# Patient Record
Sex: FEMALE | Race: WHITE
[De-identification: names, ages, dates, MRNs, and addresses within clinical notes are randomized per-mention and may not be internally consistent; named-entity substitution may affect disease eponyms.]

---

## 2018-03-06 ENCOUNTER — HOSPITAL ENCOUNTER (EMERGENCY)
Dept: HOSPITAL 25 - UCCORT | Age: 43
Discharge: HOME | End: 2018-03-06
Payer: COMMERCIAL

## 2018-03-06 DIAGNOSIS — R05: ICD-10-CM

## 2018-03-06 DIAGNOSIS — F17.210: ICD-10-CM

## 2018-03-06 DIAGNOSIS — Z88.0: ICD-10-CM

## 2018-03-06 DIAGNOSIS — J34.89: ICD-10-CM

## 2018-03-06 DIAGNOSIS — J06.9: Primary | ICD-10-CM

## 2018-03-06 PROCEDURE — 99202 OFFICE O/P NEW SF 15 MIN: CPT

## 2018-03-06 PROCEDURE — 87502 INFLUENZA DNA AMP PROBE: CPT

## 2018-03-06 PROCEDURE — G0463 HOSPITAL OUTPT CLINIC VISIT: HCPCS

## 2018-03-06 NOTE — UC
UC General HPI





- HPI Summary


HPI Summary: 





pt is c/o scratchy throat, L ear pain and now subjective fever, chills, watery 

eyes and cough with head/congestion. no cp, sob or hx asthma onset 5 days ago. 

now worsening.





- History of Current Complaint


Chief Complaint: UCRespiratory


Stated Complaint: HA,SHONA,FEVER,COUGH,EARS


Time Seen by Provider: 03/06/18 17:18


Hx Obtained From: Patient


Onset/Duration: Gradual Onset


Timing: Constant


Pain Intensity: 4


Associated Signs & Symptoms: Positive: Cough, Fever


Similar Episode/Dx as: sinus infection and bronchitis





- Allergy/Home Medications


Allergies/Adverse Reactions: 


 Allergies











Allergy/AdvReac Type Severity Reaction Status Date / Time


 


Penicillins Allergy Unknown Hives Verified 03/06/18 16:54











Home Medications: 


 Home Medications





Cyclobenzaprine TAB* [Flexeril 10 MG TAB*] 10 mg PO TID PRN 03/06/18 [History 

Confirmed 03/06/18]


Dm/Acetaminophen/Doxylamine [Night Cold-Flu Relief Liq Gel] 1 each PO PRN 03/06/ 18 [History]


Pseudoephedrine TAB* [Sudafed TAB*] 30 mg PO Q6H PRN 03/06/18 [History 

Confirmed 03/06/18]


diPHENhydraMINE PO* [Benadryl PO 25 MG TAB*] 25 mg PO Q6H PRN 03/06/18 [History 

Confirmed 03/06/18]











PMH/Surg Hx/FS Hx/Imm Hx





- Additional Past Medical History


Additional PMH: 





sinusitis, bronchitis





- Surgical History


Surgical History: Yes


Surgery Procedure, Year, and Place: PRARTIAL HYSTERECTOMY, APPENDECTOMY, 

CHOLYCYSTECTOMY





- Social History


Occupation: Employed Full-time


Lives: With Family


Alcohol Use: Rare


Substance Use Type: None


Smoking Status (MU): Light Every Day Tobacco Smoker


Type: Cigarettes


Amount Used/How Often: 1/2 PPD


Household Exposure Type: Cigarettes





- Immunization History


Vaccination Up to Date: Yes





Review of Systems


Constitutional: Fever, Chills


ENT: Sore Throat, Ear Ache, Nasal Discharge, Sinus Congestion, Sinus Pain/

Tenderness


Respiratory: Cough


Is Patient Immunocompromised?: No


All Other Systems Reviewed And Are Negative: Yes





Physical Exam


Triage Information Reviewed: Yes


Appearance: Well-Appearing


Vital Signs: 


 Initial Vital Signs











Temp  98.1 F   03/06/18 16:56


 


Pulse  85   03/06/18 16:56


 


Resp  18   03/06/18 16:56


 


BP  120/76   03/06/18 16:56


 


Pulse Ox  100   03/06/18 16:56











Vital Signs Reviewed: Yes


Eyes: Positive: Conjunctiva Clear, Other: - watery eyes


ENT: Positive: Pharynx normal, Nasal congestion, Nasal drainage - mucoid


Respiratory: Positive: Lungs clear, No respiratory distress, Decreased breath 

sounds, Other: - cough is congested


Cardiovascular: Positive: RRR, No Murmur


Abdomen Description: Positive: Nontender, No Organomegaly, Soft


Bowel Sounds: Positive: Present


Neurological: Positive: Alert


Psychological: Positive: Age Appropriate Behavior


Skin Exam: Normal





Diagnostics





- Laboratory


Diagnostic Studies Completed/Ordered: rapid flu=negative





Course/Dx





- Course


Course Of Treatment: non toxic, ill x 5 days but is worsening. will tx 

albuterol inhaler. if not improving within a couple of days, pt will start 

doxycycline for sinus and lung coverage.





- Differential Dx - Multi-Symptom


Provider Diagnoses: URI. Cough. Chest congestion





Discharge





- Discharge Plan


Condition: Stable


Disposition: HOME


Prescriptions: 


Albuterol HFA INHALER* [Ventolin HFA Inhaler*] 2 puff INH Q6H #1 mdi


DOXYcycline CAP(*) [DOXYcycline 100MG CAP(*)] 100 mg PO DAILY #20 cap


Patient Education Materials:  Upper Respiratory Infection (ED), Acute Cough (ED)


Forms:  *Work Release


Referrals: 


Non Staff,Doctor [Primary Care Provider] - 


Additional Instructions: 


IF NOT IMPROVING IN 2 DAYS, START THE ANTIBIOTIC. START IT SOONER FOR ANY 

WORSENING.





FOLLOW UP WITH MARY SILVESTRE/PA(YOUR PRIMARY CARE). [phone] IN 5-

7 DAYS FOR A RECHECK OR SOONER IF WORSE.